# Patient Record
Sex: MALE | Race: WHITE | NOT HISPANIC OR LATINO
[De-identification: names, ages, dates, MRNs, and addresses within clinical notes are randomized per-mention and may not be internally consistent; named-entity substitution may affect disease eponyms.]

---

## 2017-02-27 ENCOUNTER — RX ONLY (OUTPATIENT)
Age: 67
Setting detail: RX ONLY
End: 2017-02-27

## 2017-07-12 ENCOUNTER — PATIENT OUTREACH (OUTPATIENT)
Dept: HEALTH INFORMATION MANAGEMENT | Facility: OTHER | Age: 67
End: 2017-07-12

## 2017-07-12 NOTE — PROGRESS NOTES
7/12/17   -  Outcome: Left Message    WebIZ Checked & Epic Updated:  yes    [facility] Verified: no    Attempt # 1

## 2017-08-11 PROBLEM — Z72.0 TOBACCO ABUSE: Status: ACTIVE | Noted: 2017-08-11

## 2017-08-17 PROBLEM — E78.5 HYPERLIPIDEMIA: Status: ACTIVE | Noted: 2017-08-17

## 2017-12-17 PROBLEM — D47.2 MGUS (MONOCLONAL GAMMOPATHY OF UNKNOWN SIGNIFICANCE): Status: ACTIVE | Noted: 2017-12-17

## 2017-12-22 PROBLEM — R73.02 GLUCOSE INTOLERANCE (IMPAIRED GLUCOSE TOLERANCE): Status: ACTIVE | Noted: 2017-12-22
